# Patient Record
Sex: MALE | Race: WHITE | NOT HISPANIC OR LATINO | Employment: OTHER | URBAN - METROPOLITAN AREA
[De-identification: names, ages, dates, MRNs, and addresses within clinical notes are randomized per-mention and may not be internally consistent; named-entity substitution may affect disease eponyms.]

---

## 2020-02-10 PROBLEM — N21.0 BLADDER STONE: Status: ACTIVE | Noted: 2020-02-10

## 2020-02-10 PROBLEM — R93.812: Status: ACTIVE | Noted: 2020-02-10

## 2020-02-10 PROBLEM — N31.9 NEUROGENIC BLADDER: Status: ACTIVE | Noted: 2020-02-10

## 2020-06-30 ENCOUNTER — NURSE TRIAGE (OUTPATIENT)
Dept: ADMINISTRATIVE | Facility: CLINIC | Age: 30
End: 2020-06-30

## 2020-06-30 NOTE — TELEPHONE ENCOUNTER
Pt contacted through the Post Procedural Symptom Tracker. No answer. No additional contact today as per post procedure protocol.  dy 14      Reason for Disposition   No answer.  First attempt to contact caller.  Follow-up call scheduled within 15 minutes.    Protocols used: NO CONTACT OR DUPLICATE CONTACT CALL-A-[place]

## 2022-06-14 PROBLEM — N53.14 RETROGRADE EJACULATION: Status: ACTIVE | Noted: 2022-06-14

## 2022-06-14 PROBLEM — N50.0 ATROPHY OF TESTIS: Status: ACTIVE | Noted: 2022-06-14
